# Patient Record
Sex: FEMALE | Race: WHITE | NOT HISPANIC OR LATINO | URBAN - NONMETROPOLITAN AREA
[De-identification: names, ages, dates, MRNs, and addresses within clinical notes are randomized per-mention and may not be internally consistent; named-entity substitution may affect disease eponyms.]

---

## 2016-08-16 PROBLEM — H16.223: Noted: 2018-10-18

## 2018-10-18 PROBLEM — H16.223: Noted: 2018-10-18

## 2019-04-19 ENCOUNTER — IMPORTED ENCOUNTER (OUTPATIENT)
Dept: URBAN - NONMETROPOLITAN AREA CLINIC 1 | Facility: CLINIC | Age: 84
End: 2019-04-19

## 2019-04-19 PROCEDURE — 92014 COMPRE OPH EXAM EST PT 1/>: CPT

## 2019-04-19 NOTE — PATIENT DISCUSSION
POAG OU (Moderate)- Discussed findings of exam in detail with the patient. - Importance of good compliance with medications was emphasized. - Optos done previously stable cupping OU at this time- Gonio done previously grade IV with light pigment OU- OCT done today OD shows Superior NFL thinning and OS shows No NFL thinning - VF done today OD Good field with Inferior arcuate defect and OS shows Good field with Borderline superior arcuate  - PACH previously is 552 OD and 554 OS.- HX of SLT 2016- IOP OD 21 and OS 12- CDs noted at 0.7 OD and 0.6 OS. 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 - Continue to monitor. 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 - The risk of retinal detachment in patients with PVDs was discussed with the patient and the warning signs of retinal detachment were carefully reviewed with the patient. - The patient was warned to return to the office or contact the ophthalmologist on call immediately if they experience signs of retinal detachment or changes in vision noted from today.  - Continue to monitor.; 's Notes: *** T-IOP 13-14 ***MR  8/22/2017DFE  8/22/2017Optos  3/15/2018OCT disc  10/12/18 [facility]  4/19/19Gonio 3/15/2018PACH 12/20/16

## 2019-10-25 ENCOUNTER — IMPORTED ENCOUNTER (OUTPATIENT)
Dept: URBAN - NONMETROPOLITAN AREA CLINIC 1 | Facility: CLINIC | Age: 84
End: 2019-10-25

## 2019-10-25 PROCEDURE — 92014 COMPRE OPH EXAM EST PT 1/>: CPT

## 2019-10-25 PROCEDURE — 92133 CPTRZD OPH DX IMG PST SGM ON: CPT

## 2019-10-25 NOTE — PATIENT DISCUSSION
POAG OU (Moderate)- Discussed findings of exam in detail with the patient. - Importance of good compliance with medications was emphasized. - Optos done previously stable cupping OU at this time- Gonio done previously grade IV with light pigment OU- OCT done today OD shows Superior NFL thinning and OS shows Borderline Superior NFL thinning with slight progression- VF done previously OD Good field with Inferior arcuate defect and OS shows Good field with Borderline superior arcuate  - PACH previously is 552 OD and 554 OS.- HX of SLT 2016- IOP OD 26 and OS 16- CDs noted at 0.7 OD and 0.6 OS. 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 - Continue to monitor. 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 - The risk of retinal detachment in patients with PVDs was discussed with the patient and the warning signs of retinal detachment were carefully reviewed with the patient. - The patient was warned to return to the office or contact the ophthalmologist on call immediately if they experience signs of retinal detachment or changes in vision noted from today.  - Continue to monitor.; 's Notes: *** T-IOP 13-14 ***MR  8/22/2017DFE  8/22/2017Optos  3/15/2018OCT disc  10/25/19 [facility] VF  4/19/19Gonio 3/15/2018PACH 12/20/16

## 2019-12-27 ENCOUNTER — IMPORTED ENCOUNTER (OUTPATIENT)
Dept: URBAN - NONMETROPOLITAN AREA CLINIC 1 | Facility: CLINIC | Age: 84
End: 2019-12-27

## 2019-12-27 PROCEDURE — 99213 OFFICE O/P EST LOW 20 MIN: CPT

## 2019-12-27 NOTE — PATIENT DISCUSSION
POAG OU (Moderate)- Discussed findings of exam in detail with the patient. - Importance of good compliance with medications was emphasized. - Optos done previously stable cupping OU at this time- Gonio done previously grade IV with light pigment OU- OCT done previously OD shows Superior NFL thinning and OS shows Borderline Superior NFL thinning with slight progression- VF done previously OD Good field with Inferior arcuate defect and OS shows Good field with Borderline superior arcuate  - PACH previously is 552 OD and 554 OS.- HX of SLT 2016- IOP OD 14 and OS 15- CDs noted at 0.7 OD and 0.6 OS. 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 - Continue to monitor. 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 - The risk of retinal detachment in patients with PVDs was discussed with the patient and the warning signs of retinal detachment were carefully reviewed with the patient. - The patient was warned to return to the office or contact the ophthalmologist on call immediately if they experience signs of retinal detachment or changes in vision noted from today.  - Continue to monitor.; 's Notes: *** T-IOP 13-14 ***MR  8/22/2017DFE  8/22/2017Optos  3/15/2018OCT disc  10/25/19 [facility] 74 VF  4/19/19Gonio 3/15/2018PACH 12/20/16

## 2020-08-24 ENCOUNTER — IMPORTED ENCOUNTER (OUTPATIENT)
Dept: URBAN - NONMETROPOLITAN AREA CLINIC 1 | Facility: CLINIC | Age: 85
End: 2020-08-24

## 2020-08-24 PROCEDURE — 92014 COMPRE OPH EXAM EST PT 1/>: CPT

## 2020-08-24 PROCEDURE — 92083 EXTENDED VISUAL FIELD XM: CPT

## 2020-08-24 NOTE — PATIENT DISCUSSION
POAG OU (Moderate)- Discussed findings of exam in detail with the patient. - Importance of good compliance with medications was emphasized. - Optos done previously stable cupping OU at this time- Gonio done previously grade IV with light pigment OU- OCT done previously OD shows Superior NFL thinning and OS shows Borderline Superior NFL thinning with slight progression- VF done today OD shows Good field with Inferior Nasal and Superior Nasal Step and OS shows Good field with Borderline Superior Arcuate   - PACH previously is 552 OD and 554 OS.- HX of SLT 2016- IOP 16 OU- CDs noted at 0.7 OD and 0.6 OS. - Continue Latanoprost QHS OU - Continue Alphagan P .1% QAM OU  - Continue to monitor - RTC 6 months F/U  with OCT ONH Pseudophakia OU- Discussed diagnosis in detail w/ pt today- PCO noted but not visually significant. - Continue to monitor. 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 - The risk of retinal detachment in patients with PVDs was discussed with the patient and the warning signs of retinal detachment were carefully reviewed with the patient. - The patient was warned to return to the office or contact the ophthalmologist on call immediately if they experience signs of retinal detachment or changes in vision noted from today.  - Continue to monitor.; 's Notes: *** T-IOP 13-14 ***MR  8/22/2017DFE  8/22/2017Optos  3/15/2018OCT disc  10/25/19 [facility] 74 VF  8/24/20Gonio 3/15/2018PACH 12/20/16

## 2021-02-25 ENCOUNTER — IMPORTED ENCOUNTER (OUTPATIENT)
Dept: URBAN - NONMETROPOLITAN AREA CLINIC 1 | Facility: CLINIC | Age: 86
End: 2021-02-25

## 2021-02-25 PROCEDURE — 99213 OFFICE O/P EST LOW 20 MIN: CPT

## 2021-02-25 PROCEDURE — 92133 CPTRZD OPH DX IMG PST SGM ON: CPT

## 2021-02-25 NOTE — PATIENT DISCUSSION
POAG OU (Moderate)- Discussed findings of exam in detail with the patient. - Importance of good compliance with medications was emphasized. - Optos done previously stable cupping OU at this time- Gonio done previously grade IV with light pigment OU- OCT done today shows Borderline Superior NFL thinning OU- VF done previously OD shows Good field with Inferior Nasal and Superior Nasal Step and OS shows Good field with Borderline Superior Arcuate   - PACH previously is 552 OD and 554 OS.- HX of SLT 2016- IOP 15 OU- CDs noted at 0.7 OD and 0.6 OS. - Continue Latanoprost QHS OU - Continue Alphagan P .1% QAM OU  - Continue to monitor - RTC 6 months F/U  with VFPseudophakia OU- Discussed diagnosis in detail w/ pt today- PCO noted but not visually significant. - Continue to monitor. 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 - The risk of retinal detachment in patients with PVDs was discussed with the patient and the warning signs of retinal detachment were carefully reviewed with the patient. - The patient was warned to return to the office or contact the ophthalmologist on call immediately if they experience signs of retinal detachment or changes in vision noted from today.  - Continue to monitor.; 's Notes: *** T-IOP 13-14 ***MR  8/22/2017DFE  8/22/2017Optos  3/15/2018OCT disc 2/25/21VF  8/24/20Gonio 3/15/2018PACH 12/20/16

## 2021-08-26 ENCOUNTER — IMPORTED ENCOUNTER (OUTPATIENT)
Dept: URBAN - NONMETROPOLITAN AREA CLINIC 1 | Facility: CLINIC | Age: 86
End: 2021-08-26

## 2021-08-26 PROCEDURE — 92083 EXTENDED VISUAL FIELD XM: CPT

## 2021-08-26 PROCEDURE — 99214 OFFICE O/P EST MOD 30 MIN: CPT

## 2021-08-26 NOTE — PATIENT DISCUSSION
POAG OU (Moderate)- Discussed findings of exam in detail with the patient. - Optos done previously stable cupping OU at this time- Gonio done previously grade IV with light pigment OU- OCT done previously shows Borderline Superior NFL thinning OU- VF done today OD shows Good field and Normal and OS shows Good fieldwith Non Specific Defect   - [facility] previously is 552 OD and 554 OS.- HX of SLT 2016- IOP re-checked by Dr. Delcid Akil today OD 30 and OS 20- Cup to Disc noted at 0.7 OD and 0.6 OS. - Continue Latanoprost QHS OU - Continue Alphagan P .1% increase to BID OU- Consoder SLT again  - Continue to monitor - RTC 3 weeks pressure checkPseudophakia OU- Discussed diagnosis in detail w/ pt today- PCO noted but not visually significant. - Continue to monitor. 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 - The risk of retinal detachment in patients with PVDs was discussed with the patient and the warning signs of retinal detachment were carefully reviewed with the patient. - The patient was warned to return to the office or contact the ophthalmologist on call immediately if they experience signs of retinal detachment or changes in vision noted from today.  - Continue to monitor.; 's Notes: *** T-IOP 13-14 ***MR  8/22/2017DFE  8/22/2017Optos  3/15/2018OCT disc 2/25/21VF  8/26/21Gonio 3/15/2018PACH 12/20/16

## 2021-09-16 ENCOUNTER — IMPORTED ENCOUNTER (OUTPATIENT)
Dept: URBAN - NONMETROPOLITAN AREA CLINIC 1 | Facility: CLINIC | Age: 86
End: 2021-09-16

## 2021-09-16 PROCEDURE — 99213 OFFICE O/P EST LOW 20 MIN: CPT

## 2021-09-16 NOTE — PATIENT DISCUSSION
POAG OU (Moderate)- Discussed findings of exam in detail with the patient. - Optos done previously stable cupping OU at this time- Gonio done previously grade IV with light pigment OU- OCT done previously shows Borderline Superior NFL thinning OU- VF done previously OD shows Good field and Normal and OS shows Good fieldwith Non Specific Defect   - [facility] previously is 552 OD and 554 OS.- HX of SLT 2016- IOP OD 23 and OS 20- Cup to Disc noted at 0.7 OD and 0.6 OS. - Continue Latanoprost QHS OU - Continue Alphagan P .1% BID OU- Consider SLT again  - Continue to monitor - RTC 3 weeks pressure checkPseudophakia OU- Discussed diagnosis in detail w/ pt today- PCO noted but not visually significant. - Continue to monitor. 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 - The risk of retinal detachment in patients with PVDs was discussed with the patient and the warning signs of retinal detachment were carefully reviewed with the patient. - The patient was warned to return to the office or contact the ophthalmologist on call immediately if they experience signs of retinal detachment or changes in vision noted from today. - Continue to monitor.  Recommend Estanismicky Sanchez or Pataday for itching as needed 9/16/21; 's Notes: *** T-IOP 13-14 ***MR  8/22/2017DFE  8/22/2017Optos  3/15/2018OCT disc 2/25/21VF  8/26/21Gonio 3/15/2018PACH 12/20/16

## 2022-04-15 ASSESSMENT — TONOMETRY
OD_IOP_MMHG: 26
OD_IOP_MMHG: 23
OD_IOP_MMHG: 29
OS_IOP_MMHG: 12
OD_IOP_MMHG: 21
OS_IOP_MMHG: 19
OD_IOP_MMHG: 14
OS_IOP_MMHG: 23
OD_IOP_MMHG: 28
OD_IOP_MMHG: 15
OS_IOP_MMHG: 15
OS_IOP_MMHG: 16
OS_IOP_MMHG: 15
OS_IOP_MMHG: 20
OD_IOP_MMHG: 16
OD_IOP_MMHG: 30
OS_IOP_MMHG: 16
OS_IOP_MMHG: 18

## 2022-04-15 ASSESSMENT — PACHYMETRY
OD_CT_UM: 552; ADJ: THIN
OD_CT_UM: 552; ADJ: THIN
OS_CT_UM: 554; ADJ: THIN
OS_CT_UM: 554; ADJ: THIN
OD_CT_UM: 552; ADJ: THIN
OD_CT_UM: 552; ADJ: THIN
OS_CT_UM: 554; ADJ: THIN
OD_CT_UM: 552; ADJ: THIN
OS_CT_UM: 554; ADJ: THIN
OD_CT_UM: 552; ADJ: THIN
OS_CT_UM: 554; ADJ: THIN
OD_CT_UM: 552; ADJ: THIN
OS_CT_UM: 554; ADJ: THIN
OS_CT_UM: 554; ADJ: THIN

## 2022-04-15 ASSESSMENT — VISUAL ACUITY
OS_CC: 20/60
OS_SC: 20/30
OS_SC: 20/25-2
OD_PH: 20/40
OS_SC: 20/40
OS_SC: 20/22-1
OD_SC: 20/30+2
OS_SC: 20/25-
OS_SC: 20/25-2
OD_PH: 20/40-
OD_SC: 20/20-2
OD_SC: 20/30-
OD_CC: 20/100
OD_SC: 20/50
OD_SC: 20/29-1
OS_PH: 20/30-
OD_SC: 20/40

## 2022-05-19 ENCOUNTER — FOLLOW UP (OUTPATIENT)
Dept: URBAN - NONMETROPOLITAN AREA CLINIC 1 | Facility: CLINIC | Age: 87
End: 2022-05-19

## 2022-05-19 DIAGNOSIS — H35.62: ICD-10-CM

## 2022-05-19 DIAGNOSIS — H40.1132: ICD-10-CM

## 2022-05-19 DIAGNOSIS — H40.052: ICD-10-CM

## 2022-05-19 PROCEDURE — 99213 OFFICE O/P EST LOW 20 MIN: CPT

## 2022-05-19 PROCEDURE — 92250 FUNDUS PHOTOGRAPHY W/I&R: CPT

## 2022-05-19 ASSESSMENT — TONOMETRY
OS_IOP_MMHG: 20
OD_IOP_MMHG: 20

## 2022-05-19 ASSESSMENT — VISUAL ACUITY
OS_CC: 20/32
OD_CC: 20/50-2

## 2022-05-19 NOTE — PATIENT DISCUSSION
Pt states that her blood pressure has been running. Retinal heme noted OS on optos. Secondary to hypertion.

## 2022-09-27 ENCOUNTER — FOLLOW UP (OUTPATIENT)
Dept: URBAN - NONMETROPOLITAN AREA CLINIC 1 | Facility: CLINIC | Age: 87
End: 2022-09-27

## 2022-09-27 DIAGNOSIS — H35.62: ICD-10-CM

## 2022-09-27 PROCEDURE — 99213 OFFICE O/P EST LOW 20 MIN: CPT

## 2022-09-27 PROCEDURE — 92134 CPTRZ OPH DX IMG PST SGM RTA: CPT

## 2022-09-27 ASSESSMENT — VISUAL ACUITY
OS_CC: 20/32+
OD_CC: 20/50-2

## 2022-09-27 ASSESSMENT — TONOMETRY
OS_IOP_MMHG: 20
OD_IOP_MMHG: 19

## 2022-09-27 NOTE — PATIENT DISCUSSION
Previously: Pt states that her blood pressure has been running high. Retinal heme noted OS on optos previously. Secondary to hypertension.

## 2023-04-06 ENCOUNTER — FOLLOW UP (OUTPATIENT)
Dept: URBAN - NONMETROPOLITAN AREA CLINIC 1 | Facility: CLINIC | Age: 88
End: 2023-04-06

## 2023-04-06 DIAGNOSIS — H40.1132: ICD-10-CM

## 2023-04-06 PROCEDURE — 92133 CPTRZD OPH DX IMG PST SGM ON: CPT

## 2023-04-06 PROCEDURE — 99214 OFFICE O/P EST MOD 30 MIN: CPT

## 2023-04-06 ASSESSMENT — TONOMETRY
OD_IOP_MMHG: 24
OS_IOP_MMHG: 22

## 2023-04-06 ASSESSMENT — VISUAL ACUITY
OS_CC: 20/40+1
OD_PH: 20/50
OU_CC: 20/40-1
OD_CC: 20/60-1

## 2024-01-03 ENCOUNTER — FOLLOW UP (OUTPATIENT)
Dept: URBAN - NONMETROPOLITAN AREA CLINIC 1 | Facility: CLINIC | Age: 89
End: 2024-01-03

## 2024-01-03 DIAGNOSIS — H40.1132: ICD-10-CM

## 2024-01-03 PROCEDURE — 99213 OFFICE O/P EST LOW 20 MIN: CPT

## 2024-01-03 ASSESSMENT — VISUAL ACUITY
OD_PH: 20/40-2
OS_CC: 20/40
OU_CC: 20/50
OD_CC: 20/50-1

## 2024-01-03 ASSESSMENT — TONOMETRY
OD_IOP_MMHG: 30
OS_IOP_MMHG: 18

## 2024-07-08 ENCOUNTER — FOLLOW UP (OUTPATIENT)
Dept: URBAN - NONMETROPOLITAN AREA CLINIC 1 | Facility: CLINIC | Age: 89
End: 2024-07-08

## 2024-07-08 DIAGNOSIS — H40.1132: ICD-10-CM

## 2024-07-08 DIAGNOSIS — H10.13: ICD-10-CM

## 2024-07-08 DIAGNOSIS — H40.052: ICD-10-CM

## 2024-07-08 DIAGNOSIS — H52.03: ICD-10-CM

## 2024-07-08 PROCEDURE — 92133 CPTRZD OPH DX IMG PST SGM ON: CPT

## 2024-07-08 PROCEDURE — 99213 OFFICE O/P EST LOW 20 MIN: CPT

## 2024-07-08 PROCEDURE — 92015 DETERMINE REFRACTIVE STATE: CPT

## 2024-07-08 RX ORDER — DORZOLAMIDE 20 MG/ML: 1 SOLUTION/ DROPS OPHTHALMIC TWICE A DAY

## 2024-07-08 ASSESSMENT — TONOMETRY
OD_IOP_MMHG: 32
OS_IOP_MMHG: 20

## 2024-07-08 ASSESSMENT — VISUAL ACUITY
OS_CC: 20/50+1
OD_PH: 20/40-2
OD_CC: 20/60

## 2024-10-07 ENCOUNTER — FOLLOW UP (OUTPATIENT)
Dept: URBAN - NONMETROPOLITAN AREA CLINIC 1 | Facility: CLINIC | Age: 89
End: 2024-10-07

## 2024-10-07 DIAGNOSIS — H40.1132: ICD-10-CM

## 2024-10-07 PROCEDURE — 99213 OFFICE O/P EST LOW 20 MIN: CPT

## 2025-07-14 ENCOUNTER — FOLLOW UP (OUTPATIENT)
Age: OVER 89
End: 2025-07-14

## 2025-07-14 DIAGNOSIS — H40.1132: ICD-10-CM

## 2025-07-14 PROCEDURE — 99213 OFFICE O/P EST LOW 20 MIN: CPT

## 2025-07-14 RX ORDER — BRIMONIDINE TARTRATE 2 MG/MG: 1 SOLUTION/ DROPS OPHTHALMIC TWICE A DAY
